# Patient Record
Sex: FEMALE | Race: OTHER | Employment: UNEMPLOYED | URBAN - METROPOLITAN AREA
[De-identification: names, ages, dates, MRNs, and addresses within clinical notes are randomized per-mention and may not be internally consistent; named-entity substitution may affect disease eponyms.]

---
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
## 2025-03-10 ENCOUNTER — OFFICE VISIT (OUTPATIENT)
Dept: OBGYN CLINIC | Age: 20
End: 2025-03-10
Payer: COMMERCIAL

## 2025-03-10 ENCOUNTER — HOSPITAL ENCOUNTER (OUTPATIENT)
Age: 20
Setting detail: SPECIMEN
Discharge: HOME | End: 2025-03-10

## 2025-03-10 VITALS
WEIGHT: 156 LBS | HEIGHT: 67 IN | SYSTOLIC BLOOD PRESSURE: 114 MMHG | BODY MASS INDEX: 24.48 KG/M2 | HEART RATE: 74 BPM | DIASTOLIC BLOOD PRESSURE: 73 MMHG

## 2025-03-10 DIAGNOSIS — Z11.3 SCREENING EXAMINATION FOR STI: ICD-10-CM

## 2025-03-10 DIAGNOSIS — N89.8 VAGINAL ODOR: ICD-10-CM

## 2025-03-10 DIAGNOSIS — N89.8 VAGINAL ODOR: Primary | ICD-10-CM

## 2025-03-10 PROCEDURE — 99212 OFFICE O/P EST SF 10 MIN: CPT

## 2025-03-10 PROCEDURE — G8420 CALC BMI NORM PARAMETERS: HCPCS

## 2025-03-10 PROCEDURE — 1036F TOBACCO NON-USER: CPT

## 2025-03-10 PROCEDURE — G8427 DOCREV CUR MEDS BY ELIG CLIN: HCPCS

## 2025-03-10 NOTE — PROGRESS NOTES
[facility] - [place]  [address]  [address]  [facility] [address]  Dept: [phone]     Monica Laguna is a 20 y.o. y.o. female who presents today for had concerns including Vaginal Odor (Pt came in today with a vaginal odor/States no discharge no itching ).  Happy with the pll, her Iud removed 1-11-24  Menstrual pattern: She had been bleeding irregularly.   Contraception: OCP (estrogen/progesterone)    No Known Allergies   Patient Active Problem List   Diagnosis    Suicide attempt ([facility])    Current severe episode of major depressive disorder without psychotic features without prior episode ([facility])    9/2/20 Suction D&C     Tobacco use complicating pregnancy    THC Use     Asthma    Seizure disorder ([facility])    VAVD 7/17/23 F Apg** Wt**    Review of Systems:  Review of Systems   Genitourinary:  Positive for vaginal discharge. Negative for decreased urine volume, difficulty urinating, dyspareunia, dysuria, frequency, hematuria, menstrual problem, urgency and vaginal pain.   All other systems reviewed and are negative.   has vaginal odor, denies itching    /73 (BP Site: Right Upper Arm, Patient Position: Sitting, BP Cuff Size: Medium Adult)   Pulse 74   Ht 1.702 m (5' 7\")   Wt 70.8 kg (156 lb)   BMI 24.43 kg/m²   Physical Exam  Constitutional:       General: She is not in acute distress.     Appearance: Normal appearance.   Genitourinary:      Vulva and urethral meatus normal.      Right Labia: No tenderness, lesions or skin changes.     Left Labia: No tenderness, lesions or skin changes.     Vaginal discharge present.   Pulmonary:      Effort: Pulmonary effort is normal.   Psychiatric:         Speech: Speech normal.         Behavior: Behavior normal.   Vitals reviewed.      Assessment and Plan:  Monica was seen today for vaginal odor.    Diagnoses and all orders for this visit:    Vaginal odor  -     Vaginitis DNA Probe; Future  -

## 2025-03-11 ENCOUNTER — RESULTS FOLLOW-UP (OUTPATIENT)
Dept: OBGYN CLINIC | Age: 20
End: 2025-03-11

## 2025-03-11 LAB
C TRACH DNA SPEC QL PROBE+SIG AMP: NEGATIVE
CANDIDA SPECIES: NEGATIVE
GARDNERELLA VAGINALIS: POSITIVE
N GONORRHOEA DNA SPEC QL PROBE+SIG AMP: NEGATIVE
SOURCE: ABNORMAL
SPECIMEN DESCRIPTION: NORMAL
TRICHOMONAS: NEGATIVE

## 2025-03-11 RX ORDER — METRONIDAZOLE 500 MG/1
500 TABLET ORAL 2 TIMES DAILY
Qty: 14 TABLET | Refills: 0 | Status: SHIPPED | OUTPATIENT
Start: 2025-03-11 | End: 2025-03-18

## 2025-07-28 ENCOUNTER — TELEPHONE (OUTPATIENT)
Dept: OBGYN CLINIC | Age: 20
End: 2025-07-28

## 2025-07-28 ENCOUNTER — OFFICE VISIT (OUTPATIENT)
Dept: OBGYN CLINIC | Age: 20
End: 2025-07-28
Payer: COMMERCIAL

## 2025-07-28 ENCOUNTER — HOSPITAL ENCOUNTER (OUTPATIENT)
Age: 20
Setting detail: SPECIMEN
Discharge: HOME | End: 2025-07-28

## 2025-07-28 VITALS
HEIGHT: 67 IN | HEART RATE: 76 BPM | DIASTOLIC BLOOD PRESSURE: 78 MMHG | BODY MASS INDEX: 28.25 KG/M2 | SYSTOLIC BLOOD PRESSURE: 113 MMHG | WEIGHT: 180 LBS

## 2025-07-28 DIAGNOSIS — N92.6 IRREGULAR MENSTRUAL CYCLE: ICD-10-CM

## 2025-07-28 DIAGNOSIS — N89.8 VAGINAL DISCHARGE: ICD-10-CM

## 2025-07-28 DIAGNOSIS — N92.6 MISSED PERIOD: ICD-10-CM

## 2025-07-28 DIAGNOSIS — N89.8 VAGINAL ODOR: ICD-10-CM

## 2025-07-28 DIAGNOSIS — N89.8 VAGINAL ODOR: Primary | ICD-10-CM

## 2025-07-28 LAB
CANDIDA SPECIES: POSITIVE
CONTROL: NORMAL
GARDNERELLA VAGINALIS: POSITIVE
PREGNANCY TEST URINE, POC: NORMAL
SOURCE: ABNORMAL
TRICHOMONAS: NEGATIVE

## 2025-07-28 PROCEDURE — G8427 DOCREV CUR MEDS BY ELIG CLIN: HCPCS | Performed by: NURSE PRACTITIONER

## 2025-07-28 PROCEDURE — 1036F TOBACCO NON-USER: CPT | Performed by: NURSE PRACTITIONER

## 2025-07-28 PROCEDURE — 81025 URINE PREGNANCY TEST: CPT | Performed by: NURSE PRACTITIONER

## 2025-07-28 PROCEDURE — 99213 OFFICE O/P EST LOW 20 MIN: CPT | Performed by: NURSE PRACTITIONER

## 2025-07-28 PROCEDURE — G8419 CALC BMI OUT NRM PARAM NOF/U: HCPCS | Performed by: NURSE PRACTITIONER

## 2025-07-28 ASSESSMENT — ENCOUNTER SYMPTOMS
RESPIRATORY NEGATIVE: 1
GASTROINTESTINAL NEGATIVE: 1

## 2025-07-28 NOTE — PROGRESS NOTES
[facility]  [address] [place] [address]  [address]  [facility] [address]  Dept: [phone]  Dept Fax: [phone]         2025  Monica Laguna       Chief Complaint  Chief Complaint   Patient presents with    Vaginal Odor     Burning while urinating     Amenorrhea     Wants a pregnancy test    .    Blood pressure 113/78, pulse 76, height 1.689 m (5' 6.5\"), weight 81.6 kg (180 lb), not currently breastfeeding.    HPI:     Monica Laguna  2005 is a 20 y.o.  here today for evaluation of vaginal discharge with odor  Hx BV  had negative STI testing in March  She is having a vaginal odor and discomfort with urination  Unsure of her LMP negative pregnancy test.  Cycles are always irregular and she has stopped her birth control and doesn't want any  Urine pregnancy test is negative      OB History    Para Term  AB Living   2 1 1 0 1 1   SAB IAB Ectopic Molar Multiple Live Births   1 0 0 0 0 1      # Outcome Date GA Lbr Aung/2nd Weight Sex Type Anes PTL Lv   2 Term 23 38w1d 01:51 / 00:30 2.805 kg (6 lb 2.9 oz) F Vag-Spont EPI N BRII      Birth Comments: Karly Alvarez      Name: Karly Alvarez      Apgar1: 8  Apgar5: 9   1 SAB 20 8w0d    SAB          Past Medical History:   Diagnosis Date    ADHD (attention deficit hyperactivity disorder)     Anemia     Anxiety     Asthma     child-resolved    Blighted ovum 2019    Depression     Neurologic disorder     Seizures (HCC)     4 seizures as child:no medical f/u has been done. last episode age 10.    STD (sexually transmitted disease)        Past Surgical History:   Procedure Laterality Date    DILATION AND CURETTAGE OF UTERUS N/A 2020    DILATATION AND CURETTAGE SUCTION performed by Irma Means MD at [facility] OR    INTRAUTERINE DEVICE INSERTION  2023    Mirena NDC 56276-546-58 Lot [id number] Exp SEP 2025       Family History   Problem Relation Age of Onset    Heart Attack  knowledge deficit knowledge deficit

## 2025-07-29 ENCOUNTER — RESULTS FOLLOW-UP (OUTPATIENT)
Dept: OBGYN CLINIC | Age: 20
End: 2025-07-29

## 2025-07-29 DIAGNOSIS — N76.0 ACUTE VAGINITIS: Primary | ICD-10-CM

## 2025-07-29 LAB
C TRACH DNA SPEC QL PROBE+SIG AMP: NEGATIVE
MICROORGANISM SPEC CULT: NORMAL
N GONORRHOEA DNA SPEC QL PROBE+SIG AMP: NEGATIVE
SERVICE CMNT-IMP: NORMAL
SPECIMEN DESCRIPTION: NORMAL
SPECIMEN DESCRIPTION: NORMAL

## 2025-07-29 RX ORDER — METRONIDAZOLE 500 MG/1
500 TABLET ORAL 2 TIMES DAILY
Qty: 14 TABLET | Refills: 0 | Status: SHIPPED | OUTPATIENT
Start: 2025-07-29 | End: 2025-08-05

## 2025-07-29 RX ORDER — FLUCONAZOLE 150 MG/1
150 TABLET ORAL
Qty: 2 TABLET | Refills: 0 | Status: SHIPPED | OUTPATIENT
Start: 2025-07-29 | End: 2025-08-01

## 2025-07-29 NOTE — RESULT ENCOUNTER NOTE
Her swab showed both yeast and bv and I have sent a rx for both flagyl and diflucan to her pharmacy. Please let her know  Gc/ct is pending

## 2025-09-01 ENCOUNTER — HOSPITAL ENCOUNTER (EMERGENCY)
Facility: CLINIC | Age: 20
Discharge: LEFT BEFORE DISCHARGE | End: 2025-09-02
Attending: EMERGENCY MEDICINE
Payer: COMMERCIAL

## 2025-09-01 ENCOUNTER — APPOINTMENT (OUTPATIENT)
Dept: ULTRASOUND IMAGING | Facility: CLINIC | Age: 20
End: 2025-09-01
Payer: COMMERCIAL

## 2025-09-01 VITALS
SYSTOLIC BLOOD PRESSURE: 121 MMHG | HEIGHT: 68 IN | OXYGEN SATURATION: 98 % | DIASTOLIC BLOOD PRESSURE: 73 MMHG | BODY MASS INDEX: 28.04 KG/M2 | WEIGHT: 185 LBS | TEMPERATURE: 98.2 F | HEART RATE: 85 BPM | RESPIRATION RATE: 17 BRPM

## 2025-09-01 DIAGNOSIS — O26.899 ABDOMINAL CRAMPING AFFECTING PREGNANCY: Primary | ICD-10-CM

## 2025-09-01 DIAGNOSIS — R10.9 ABDOMINAL CRAMPING AFFECTING PREGNANCY: Primary | ICD-10-CM

## 2025-09-01 LAB
B-HCG SERPL EIA 3RD IS-ACNC: 954 MIU/ML (ref 0–7)
BACTERIA URNS QL MICRO: ABNORMAL
BILIRUB UR QL STRIP: NEGATIVE
CHARACTER UR: ABNORMAL
CLARITY UR: CLEAR
COLOR UR: YELLOW
EPI CELLS #/AREA URNS HPF: ABNORMAL /HPF (ref 0–5)
GLUCOSE UR STRIP-MCNC: NEGATIVE MG/DL
HGB UR QL STRIP.AUTO: NEGATIVE
KETONES UR STRIP-MCNC: ABNORMAL MG/DL
LEUKOCYTE ESTERASE UR QL STRIP: ABNORMAL
NITRITE UR QL STRIP: NEGATIVE
PH UR STRIP: 6 [PH] (ref 5–8)
PROT UR STRIP-MCNC: NEGATIVE MG/DL
RBC #/AREA URNS HPF: ABNORMAL /HPF (ref 0–2)
SP GR UR STRIP: 1.02 (ref 1–1.03)
UROBILINOGEN UR STRIP-ACNC: NORMAL EU/DL (ref 0–1)
WBC #/AREA URNS HPF: ABNORMAL /HPF (ref 0–5)

## 2025-09-01 PROCEDURE — 81001 URINALYSIS AUTO W/SCOPE: CPT

## 2025-09-01 PROCEDURE — 36415 COLL VENOUS BLD VENIPUNCTURE: CPT

## 2025-09-01 PROCEDURE — 84702 CHORIONIC GONADOTROPIN TEST: CPT

## 2025-09-01 PROCEDURE — 76817 TRANSVAGINAL US OBSTETRIC: CPT

## 2025-09-01 PROCEDURE — 99284 EMERGENCY DEPT VISIT MOD MDM: CPT

## 2025-09-01 ASSESSMENT — PAIN - FUNCTIONAL ASSESSMENT: PAIN_FUNCTIONAL_ASSESSMENT: 0-10

## 2025-09-01 ASSESSMENT — PAIN SCALES - GENERAL: PAINLEVEL_OUTOF10: 6

## 2025-09-02 ASSESSMENT — ENCOUNTER SYMPTOMS
CONSTIPATION: 0
EYE PAIN: 0
EYE DISCHARGE: 0
NAUSEA: 0
EYE REDNESS: 0
ABDOMINAL PAIN: 1
VOMITING: 0
COUGH: 0
WHEEZING: 0
SHORTNESS OF BREATH: 0
STRIDOR: 0
DIARRHEA: 0
COLOR CHANGE: 0
SORE THROAT: 0

## (undated) DEVICE — SYSTEM COLL CANSTR LID SEAL CAP W/O TISS TRAP FOR 3/8IN

## (undated) DEVICE — UNDERPANTS MAT L/XL KNIT SEAMLESS CLR CODE WAISTBAND

## (undated) DEVICE — SOLUTION SCRB 4OZ 2% CHG FOR SURG SCRBBING HND WSH

## (undated) DEVICE — 40580 - THE PINK PAD - ADVANCED TRENDELENBURG POSITIONING KIT: Brand: 40580 - THE PINK PAD - ADVANCED TRENDELENBURG POSITIONING KIT

## (undated) DEVICE — GLOVE ORANGE PI 7   MSG9070

## (undated) DEVICE — HOSE CONN L18IN UTER DISP FOR BERK SAFETOUCH SYS

## (undated) DEVICE — GARMENT,MEDLINE,DVT,INT,CALF,MED, GEN2: Brand: MEDLINE

## (undated) DEVICE — TRAP TISS DISP FOR COLL SYS BERK SAFETOUCH

## (undated) DEVICE — SVMMC GYN MIN PK

## (undated) DEVICE — SACK GZ PERM W/ O RNG BTL ONLY F/

## (undated) DEVICE — DRAPE,REIN 53X77,STERILE: Brand: MEDLINE

## (undated) DEVICE — 1016 S-DRAPE IRRIG POUCH 10/BOX: Brand: STERI-DRAPE™

## (undated) DEVICE — CAP SEAL COLLECT BTL BL

## (undated) DEVICE — COVER,LIGHT HANDLE,FLX,2/PK: Brand: MEDLINE INDUSTRIES, INC.

## (undated) DEVICE — Device

## (undated) DEVICE — PAD,SANITARY,11 IN,MAXI,W/WINGS,N-STRL: Brand: MEDLINE

## (undated) DEVICE — GOWN,AURORA,NONRNF,XL,30/CS: Brand: MEDLINE

## (undated) DEVICE — SET COLL TBNG L6FT DIA3/8IN W/ INTEGR SWVL HNDL SLIP RNG M